# Patient Record
Sex: FEMALE | Race: WHITE | Employment: OTHER | URBAN - METROPOLITAN AREA
[De-identification: names, ages, dates, MRNs, and addresses within clinical notes are randomized per-mention and may not be internally consistent; named-entity substitution may affect disease eponyms.]

---

## 2024-04-22 ENCOUNTER — OFFICE VISIT (OUTPATIENT)
Age: 89
End: 2024-04-22
Payer: COMMERCIAL

## 2024-04-22 ENCOUNTER — HOSPITAL ENCOUNTER (OUTPATIENT)
Age: 89
Setting detail: SPECIMEN
Discharge: HOME | End: 2024-04-22

## 2024-04-22 VITALS
WEIGHT: 130 LBS | DIASTOLIC BLOOD PRESSURE: 55 MMHG | HEIGHT: 57 IN | HEART RATE: 71 BPM | TEMPERATURE: 98.1 F | OXYGEN SATURATION: 92 % | BODY MASS INDEX: 28.05 KG/M2 | SYSTOLIC BLOOD PRESSURE: 148 MMHG

## 2024-04-22 DIAGNOSIS — K62.5 RECTAL BLEEDING: ICD-10-CM

## 2024-04-22 DIAGNOSIS — R10.12 LUQ ABDOMINAL PAIN: ICD-10-CM

## 2024-04-22 DIAGNOSIS — K62.5 RECTAL BLEEDING: Primary | ICD-10-CM

## 2024-04-22 DIAGNOSIS — K63.5 BENIGN COLON POLYP: ICD-10-CM

## 2024-04-22 DIAGNOSIS — R19.8 IRREGULAR BOWEL HABITS: ICD-10-CM

## 2024-04-22 DIAGNOSIS — K64.9 HEMORRHOIDS, UNSPECIFIED HEMORRHOID TYPE: ICD-10-CM

## 2024-04-22 LAB
BASOPHILS # BLD: 0.07 K/UL (ref 0–0.2)
BASOPHILS NFR BLD: 1 % (ref 0–2)
EOSINOPHIL # BLD: 0.22 K/UL (ref 0–0.44)
EOSINOPHILS RELATIVE PERCENT: 2 % (ref 1–4)
ERYTHROCYTE [DISTWIDTH] IN BLOOD BY AUTOMATED COUNT: 13.3 % (ref 11.8–14.4)
HCT VFR BLD AUTO: 36.3 % (ref 36.3–47.1)
HGB BLD-MCNC: 11.3 G/DL (ref 11.9–15.1)
IMM GRANULOCYTES # BLD AUTO: 0.07 K/UL (ref 0–0.3)
IMM GRANULOCYTES NFR BLD: 1 %
LYMPHOCYTES NFR BLD: 3.81 K/UL (ref 1.1–3.7)
LYMPHOCYTES RELATIVE PERCENT: 42 % (ref 24–43)
MCH RBC QN AUTO: 28.9 PG (ref 25.2–33.5)
MCHC RBC AUTO-ENTMCNC: 31.1 G/DL (ref 28.4–34.8)
MCV RBC AUTO: 92.8 FL (ref 82.6–102.9)
MONOCYTES NFR BLD: 0.95 K/UL (ref 0.1–1.2)
MONOCYTES NFR BLD: 10 % (ref 3–12)
NEUTROPHILS NFR BLD: 44 % (ref 36–65)
NEUTS SEG NFR BLD: 4.02 K/UL (ref 1.5–8.1)
NRBC BLD-RTO: 0 PER 100 WBC
PLATELET # BLD AUTO: 412 K/UL (ref 138–453)
PMV BLD AUTO: 10 FL (ref 8.1–13.5)
RBC # BLD AUTO: 3.91 M/UL (ref 3.95–5.11)
WBC OTHER # BLD: 9.1 K/UL (ref 3.5–11.3)

## 2024-04-22 PROCEDURE — 99203 OFFICE O/P NEW LOW 30 MIN: CPT | Performed by: NURSE PRACTITIONER

## 2024-04-22 PROCEDURE — 1123F ACP DISCUSS/DSCN MKR DOCD: CPT | Performed by: NURSE PRACTITIONER

## 2024-04-22 RX ORDER — SENNOSIDES 8.6 MG
CAPSULE ORAL EVERY 8 HOURS
COMMUNITY

## 2024-04-22 RX ORDER — APIXABAN 5 MG/1
TABLET, FILM COATED ORAL
COMMUNITY

## 2024-04-22 ASSESSMENT — ENCOUNTER SYMPTOMS
RHINORRHEA: 0
SORE THROAT: 0
COUGH: 0

## 2024-04-22 NOTE — PROGRESS NOTES
[facility] General Surgery   Corey Barber MD, FACS  Marina Funes, APRN-CNP  [address], [address]  [place] [address]  P: [phone], F: [phone]    General and Robotic Surgery  Consult Note               PATIENT NAME: Geoffrey Murray   :  1930   MRN: [id number]   PCP:  Corazon Jhaveri DO     TODAY'S DATE: 2024    Chief Complaint   Patient presents with    New Patient     New pt presents for rectal bleeding since November. States she was admitted to the hospital on 2024 for PE of leanna lungs. States she is on eliquis since January. States that they had her take 5mg BID but has since decreased it and noticed a decrease in the rectal bleeding. Has hx of diverticulitis, and frequent left upper quadrant pain. Has BM everyday but states not a \"full BM' small sized feces. No personal or fm hx of colon cx.         HISTORY OF PRESENT ILLNESS: 89 y.o. female presents to discuss rectal bleeding. Presents with daughter. States has had rectal bleeding since November. Was heavy bleeding, but has improved recently- was on Eliquis BID, now only taking once/day and feels a lot better. Notes rectal bleeding with BM's, pink color notes on her sanitary pad. Also with standing in the middle of the night, was having rectal bleeding but is now improved with Eliquis once/day. Frequent LUQ pain- intermittent, not provoked by anything. Has BM every day, but small amounts. Denies black, tarry stools. Not on iron any more. Denies fever/chills. + weight loss, only eats one meal/day. States last colonoscopy was about 5 years ago. + hx of colon polyps. Denies hx of colon polyps. + rectal pain and itching, tx with hydrocortisone which does help. Sometimes notes burning as well.    Major medical hx: Diabetes, hypertension, emphysema.  Prior ABD/pelvic surgeries: Appendectomy- open, cholecystectomy- open, hysterectomy, ovary removal.  Blood thinning medications: Eliquis    PAST MEDICAL HISTORY

## 2024-04-24 PROBLEM — E11.9 TYPE 2 DIABETES MELLITUS WITHOUT COMPLICATION, WITHOUT LONG-TERM CURRENT USE OF INSULIN (HCC): Status: ACTIVE | Noted: 2023-07-09

## 2024-04-24 PROBLEM — M19.90 OSTEOARTHROSIS: Status: ACTIVE | Noted: 2023-07-09

## 2024-04-24 PROBLEM — K29.70 GASTRITIS WITHOUT BLEEDING: Status: ACTIVE | Noted: 2023-07-09

## 2024-04-24 PROBLEM — I89.0 LYMPHEDEMA: Status: ACTIVE | Noted: 2023-07-09

## 2024-04-24 PROBLEM — I51.7 LVH (LEFT VENTRICULAR HYPERTROPHY): Status: ACTIVE | Noted: 2023-07-09

## 2024-04-24 PROBLEM — G89.29 OTHER CHRONIC PAIN: Status: ACTIVE | Noted: 2023-07-09

## 2024-04-24 PROBLEM — R10.12 LUQ ABDOMINAL PAIN: Status: ACTIVE | Noted: 2024-04-24

## 2024-04-24 PROBLEM — J43.9 PULMONARY EMPHYSEMA (HCC): Status: ACTIVE | Noted: 2023-07-09

## 2024-04-24 PROBLEM — M16.0 PRIMARY OSTEOARTHRITIS OF BOTH HIPS: Status: ACTIVE | Noted: 2023-07-09

## 2024-04-24 PROBLEM — M17.12 ARTHRITIS OF LEFT KNEE: Status: ACTIVE | Noted: 2023-07-09

## 2024-04-24 PROBLEM — I26.99 PULMONARY EMBOLI (HCC): Status: ACTIVE | Noted: 2024-01-11

## 2024-04-24 PROBLEM — K63.5 BENIGN COLON POLYP: Status: ACTIVE | Noted: 2024-04-24

## 2024-04-24 PROBLEM — K29.90 GASTRODUODENITIS: Status: ACTIVE | Noted: 2020-06-08

## 2024-04-24 PROBLEM — M16.10 PRIMARY LOCALIZED OSTEOARTHRITIS OF PELVIC REGION AND THIGH: Status: ACTIVE | Noted: 2021-04-01

## 2024-04-24 PROBLEM — K21.9 GASTRO-ESOPHAGEAL REFLUX DISEASE WITHOUT ESOPHAGITIS: Status: ACTIVE | Noted: 2023-07-09

## 2024-04-24 PROBLEM — M17.9 OSTEOARTHRITIS OF KNEE: Status: ACTIVE | Noted: 2023-07-09

## 2024-04-24 PROBLEM — K62.5 RECTAL BLEEDING: Status: ACTIVE | Noted: 2024-04-24

## 2024-04-24 PROBLEM — E55.9 VITAMIN D DEFICIENCY: Status: ACTIVE | Noted: 2023-07-09

## 2024-04-24 PROBLEM — R07.9 CHEST PAIN: Status: ACTIVE | Noted: 2021-04-25

## 2024-04-24 PROBLEM — K64.9 HEMORRHOIDS: Status: ACTIVE | Noted: 2024-04-24

## 2024-04-24 PROBLEM — M19.91 PRIMARY OSTEOARTHRITIS: Status: ACTIVE | Noted: 2023-07-09

## 2024-04-24 PROBLEM — J96.11 CHRONIC RESPIRATORY FAILURE WITH HYPOXIA (HCC): Status: ACTIVE | Noted: 2024-01-04

## 2024-04-24 PROBLEM — F51.01 PRIMARY INSOMNIA: Status: ACTIVE | Noted: 2023-07-09

## 2024-04-24 PROBLEM — I10 ESSENTIAL HYPERTENSION: Status: ACTIVE | Noted: 2023-07-09

## 2024-04-24 PROBLEM — N28.9 RENAL LESION: Status: ACTIVE | Noted: 2024-01-11

## 2024-04-24 PROBLEM — Z99.81 OXYGEN DEPENDENT: Status: ACTIVE | Noted: 2023-07-09

## 2024-04-24 PROBLEM — E78.00 PURE HYPERCHOLESTEROLEMIA: Status: ACTIVE | Noted: 2023-07-09

## 2024-04-24 PROBLEM — R19.8 IRREGULAR BOWEL HABITS: Status: ACTIVE | Noted: 2024-04-24

## 2024-04-24 RX ORDER — POLYETHYLENE GLYCOL 3350 17 G/17G
17 POWDER, FOR SOLUTION ORAL DAILY PRN
Qty: 510 G | Refills: 0 | COMMUNITY
Start: 2024-04-24 | End: 2024-05-24

## 2024-04-24 ASSESSMENT — ENCOUNTER SYMPTOMS: SHORTNESS OF BREATH: 1

## 2024-05-22 ENCOUNTER — OFFICE VISIT (OUTPATIENT)
Age: 89
End: 2024-05-22
Payer: COMMERCIAL

## 2024-05-22 VITALS
SYSTOLIC BLOOD PRESSURE: 140 MMHG | HEART RATE: 73 BPM | WEIGHT: 130 LBS | HEIGHT: 57 IN | RESPIRATION RATE: 14 BRPM | BODY MASS INDEX: 28.05 KG/M2 | DIASTOLIC BLOOD PRESSURE: 52 MMHG | OXYGEN SATURATION: 93 %

## 2024-05-22 DIAGNOSIS — K64.9 HEMORRHOIDS, UNSPECIFIED HEMORRHOID TYPE: ICD-10-CM

## 2024-05-22 DIAGNOSIS — K62.5 RECTAL BLEEDING: Primary | ICD-10-CM

## 2024-05-22 DIAGNOSIS — R19.8 IRREGULAR BOWEL HABITS: ICD-10-CM

## 2024-05-22 DIAGNOSIS — R10.12 LUQ ABDOMINAL PAIN: ICD-10-CM

## 2024-05-22 DIAGNOSIS — K63.5 BENIGN COLON POLYP: ICD-10-CM

## 2024-05-22 PROCEDURE — 99214 OFFICE O/P EST MOD 30 MIN: CPT | Performed by: SURGERY

## 2024-05-22 PROCEDURE — 1123F ACP DISCUSS/DSCN MKR DOCD: CPT | Performed by: SURGERY

## 2024-05-25 ASSESSMENT — ENCOUNTER SYMPTOMS
COUGH: 0
SORE THROAT: 0
SHORTNESS OF BREATH: 0
RHINORRHEA: 0

## 2024-05-25 NOTE — PROGRESS NOTES
Reported on 4/22/2024), Disp: , Rfl:     levothyroxine (SYNTHROID) 150 MCG tablet, Take 1 tablet by mouth daily, Disp: , Rfl:     Cholecalciferol (VITAMIN D3) 2000 UNITS CAPS, Take 1 tablet by mouth daily., Disp: , Rfl:     valsartan (DIOVAN) 160 MG tablet, Take 160 mg by mouth daily. (Patient not taking: Reported on 4/22/2024), Disp: , Rfl:        Review of Systems   Constitutional:  Negative for chills and fever.   HENT:  Negative for congestion, rhinorrhea and sore throat.    Respiratory:  Negative for cough and shortness of breath.    Cardiovascular:  Negative for chest pain.   Gastrointestinal:         See HPI.   Genitourinary: Negative.    Skin: Negative.        VITALS:  BP (!) 140/52   Pulse 73   Resp 14   Ht 1.448 m (4' 9\")   Wt 59 kg (130 lb)   SpO2 93%   BMI 28.13 kg/m²       Physical Exam  Constitutional:       Appearance: Normal appearance.   HENT:      Head: Normocephalic.      Right Ear: External ear normal.      Left Ear: External ear normal.      Nose: Nose normal.   Eyes:      Conjunctiva/sclera: Conjunctivae normal.   Neck:      Trachea: No tracheal deviation.   Cardiovascular:      Rate and Rhythm: Normal rate.   Pulmonary:      Effort: Pulmonary effort is normal.      Breath sounds: Normal breath sounds.   Abdominal:      General: Bowel sounds are normal.      Palpations: Abdomen is soft.      Tenderness: There is no abdominal tenderness.   Musculoskeletal:      Cervical back: Neck supple.   Skin:     General: Skin is warm and dry.   Neurological:      General: No focal deficit present.      Mental Status: She is alert.   Psychiatric:         Mood and Affect: Mood normal.                Data  Lab Results   Component Value Date    WBC 9.1 04/22/2024    HGB 11.3 (L) 04/22/2024    HCT 36.3 04/22/2024    MCV 92.8 04/22/2024     04/22/2024     Lab Results   Component Value Date     02/03/2015    K 4.4 02/03/2015     02/03/2015    CO2 29 02/03/2015    BUN 9 02/03/2015

## 2024-07-29 ENCOUNTER — TELEPHONE (OUTPATIENT)
Age: 89
End: 2024-07-29

## 2024-07-29 NOTE — TELEPHONE ENCOUNTER
Patient called an canceled her office visit, due to falling and has a broke vertebrae Offered to reschedule, patient declined, and will call back at a later time to reschedule..